# Patient Record
Sex: FEMALE | Race: OTHER | URBAN - METROPOLITAN AREA
[De-identification: names, ages, dates, MRNs, and addresses within clinical notes are randomized per-mention and may not be internally consistent; named-entity substitution may affect disease eponyms.]

---

## 2017-03-23 ENCOUNTER — OUTPATIENT (OUTPATIENT)
Dept: OUTPATIENT SERVICES | Facility: HOSPITAL | Age: 37
LOS: 1 days | End: 2017-03-23
Payer: COMMERCIAL

## 2017-03-23 ENCOUNTER — APPOINTMENT (OUTPATIENT)
Dept: RADIOLOGY | Facility: CLINIC | Age: 37
End: 2017-03-23

## 2017-03-23 DIAGNOSIS — Z00.8 ENCOUNTER FOR OTHER GENERAL EXAMINATION: ICD-10-CM

## 2017-03-23 PROCEDURE — 73562 X-RAY EXAM OF KNEE 3: CPT

## 2019-09-30 ENCOUNTER — RESULT REVIEW (OUTPATIENT)
Age: 39
End: 2019-09-30

## 2019-11-11 ENCOUNTER — APPOINTMENT (OUTPATIENT)
Dept: MAMMOGRAPHY | Facility: CLINIC | Age: 39
End: 2019-11-11

## 2021-08-16 ENCOUNTER — APPOINTMENT (OUTPATIENT)
Dept: MAMMOGRAPHY | Facility: CLINIC | Age: 41
End: 2021-08-16

## 2022-02-04 ENCOUNTER — EMERGENCY (EMERGENCY)
Facility: HOSPITAL | Age: 42
LOS: 0 days | Discharge: HOME | End: 2022-02-05
Attending: EMERGENCY MEDICINE
Payer: SELF-PAY

## 2022-02-04 VITALS
HEART RATE: 75 BPM | RESPIRATION RATE: 20 BRPM | SYSTOLIC BLOOD PRESSURE: 133 MMHG | TEMPERATURE: 98 F | OXYGEN SATURATION: 99 % | DIASTOLIC BLOOD PRESSURE: 111 MMHG

## 2022-02-04 DIAGNOSIS — F10.129 ALCOHOL ABUSE WITH INTOXICATION, UNSPECIFIED: ICD-10-CM

## 2022-02-04 DIAGNOSIS — Z20.822 CONTACT WITH AND (SUSPECTED) EXPOSURE TO COVID-19: ICD-10-CM

## 2022-02-04 DIAGNOSIS — R00.0 TACHYCARDIA, UNSPECIFIED: ICD-10-CM

## 2022-02-04 LAB
ALBUMIN SERPL ELPH-MCNC: 4.1 G/DL — SIGNIFICANT CHANGE UP (ref 3.3–5)
ALP SERPL-CCNC: 58 U/L — SIGNIFICANT CHANGE UP (ref 40–120)
ALT FLD-CCNC: 35 U/L — SIGNIFICANT CHANGE UP (ref 12–78)
ANION GAP SERPL CALC-SCNC: 9 MMOL/L — SIGNIFICANT CHANGE UP (ref 5–17)
APAP SERPL-MCNC: <2 UG/ML — LOW (ref 10–30)
APPEARANCE UR: CLEAR — SIGNIFICANT CHANGE UP
AST SERPL-CCNC: 31 U/L — SIGNIFICANT CHANGE UP (ref 15–37)
BASOPHILS # BLD AUTO: 0.13 K/UL — SIGNIFICANT CHANGE UP (ref 0–0.2)
BASOPHILS NFR BLD AUTO: 1.4 % — SIGNIFICANT CHANGE UP (ref 0–2)
BILIRUB SERPL-MCNC: 0.3 MG/DL — SIGNIFICANT CHANGE UP (ref 0.2–1.2)
BILIRUB UR-MCNC: NEGATIVE — SIGNIFICANT CHANGE UP
BUN SERPL-MCNC: 8 MG/DL — SIGNIFICANT CHANGE UP (ref 7–23)
CALCIUM SERPL-MCNC: 8.6 MG/DL — SIGNIFICANT CHANGE UP (ref 8.5–10.1)
CHLORIDE SERPL-SCNC: 111 MMOL/L — HIGH (ref 96–108)
CO2 SERPL-SCNC: 26 MMOL/L — SIGNIFICANT CHANGE UP (ref 22–31)
COLOR SPEC: YELLOW — SIGNIFICANT CHANGE UP
CREAT SERPL-MCNC: 0.62 MG/DL — SIGNIFICANT CHANGE UP (ref 0.5–1.3)
DIFF PNL FLD: ABNORMAL
EOSINOPHIL # BLD AUTO: 0.04 K/UL — SIGNIFICANT CHANGE UP (ref 0–0.5)
EOSINOPHIL NFR BLD AUTO: 0.4 % — SIGNIFICANT CHANGE UP (ref 0–6)
ETHANOL SERPL-MCNC: 343 MG/DL — HIGH (ref 0–10)
GLUCOSE SERPL-MCNC: 91 MG/DL — SIGNIFICANT CHANGE UP (ref 70–99)
GLUCOSE UR QL: NEGATIVE — SIGNIFICANT CHANGE UP
HCG SERPL-ACNC: <1 MIU/ML — SIGNIFICANT CHANGE UP
HCT VFR BLD CALC: 37.5 % — SIGNIFICANT CHANGE UP (ref 34.5–45)
HGB BLD-MCNC: 12.3 G/DL — SIGNIFICANT CHANGE UP (ref 11.5–15.5)
IMM GRANULOCYTES NFR BLD AUTO: 0.4 % — SIGNIFICANT CHANGE UP (ref 0–1.5)
KETONES UR-MCNC: NEGATIVE — SIGNIFICANT CHANGE UP
LEUKOCYTE ESTERASE UR-ACNC: ABNORMAL
LYMPHOCYTES # BLD AUTO: 2.4 K/UL — SIGNIFICANT CHANGE UP (ref 1–3.3)
LYMPHOCYTES # BLD AUTO: 25.3 % — SIGNIFICANT CHANGE UP (ref 13–44)
MCHC RBC-ENTMCNC: 28.3 PG — SIGNIFICANT CHANGE UP (ref 27–34)
MCHC RBC-ENTMCNC: 32.8 GM/DL — SIGNIFICANT CHANGE UP (ref 32–36)
MCV RBC AUTO: 86.4 FL — SIGNIFICANT CHANGE UP (ref 80–100)
MONOCYTES # BLD AUTO: 0.25 K/UL — SIGNIFICANT CHANGE UP (ref 0–0.9)
MONOCYTES NFR BLD AUTO: 2.6 % — SIGNIFICANT CHANGE UP (ref 2–14)
NEUTROPHILS # BLD AUTO: 6.64 K/UL — SIGNIFICANT CHANGE UP (ref 1.8–7.4)
NEUTROPHILS NFR BLD AUTO: 69.9 % — SIGNIFICANT CHANGE UP (ref 43–77)
NITRITE UR-MCNC: NEGATIVE — SIGNIFICANT CHANGE UP
PCP SPEC-MCNC: SIGNIFICANT CHANGE UP
PH UR: 6 — SIGNIFICANT CHANGE UP (ref 5–8)
PLATELET # BLD AUTO: 310 K/UL — SIGNIFICANT CHANGE UP (ref 150–400)
POTASSIUM SERPL-MCNC: 3.6 MMOL/L — SIGNIFICANT CHANGE UP (ref 3.5–5.3)
POTASSIUM SERPL-SCNC: 3.6 MMOL/L — SIGNIFICANT CHANGE UP (ref 3.5–5.3)
PROT SERPL-MCNC: 7.9 GM/DL — SIGNIFICANT CHANGE UP (ref 6–8.3)
PROT UR-MCNC: NEGATIVE — SIGNIFICANT CHANGE UP
RBC # BLD: 4.34 M/UL — SIGNIFICANT CHANGE UP (ref 3.8–5.2)
RBC # FLD: 15.2 % — HIGH (ref 10.3–14.5)
SALICYLATES SERPL-MCNC: <1.7 MG/DL — LOW (ref 2.8–20)
SARS-COV-2 RNA SPEC QL NAA+PROBE: SIGNIFICANT CHANGE UP
SODIUM SERPL-SCNC: 146 MMOL/L — HIGH (ref 135–145)
SP GR SPEC: 1.01 — SIGNIFICANT CHANGE UP (ref 1.01–1.02)
UROBILINOGEN FLD QL: NEGATIVE — SIGNIFICANT CHANGE UP
WBC # BLD: 9.5 K/UL — SIGNIFICANT CHANGE UP (ref 3.8–10.5)
WBC # FLD AUTO: 9.5 K/UL — SIGNIFICANT CHANGE UP (ref 3.8–10.5)

## 2022-02-04 PROCEDURE — 85025 COMPLETE CBC W/AUTO DIFF WBC: CPT

## 2022-02-04 PROCEDURE — 99285 EMERGENCY DEPT VISIT HI MDM: CPT

## 2022-02-04 PROCEDURE — 80053 COMPREHEN METABOLIC PANEL: CPT

## 2022-02-04 PROCEDURE — 36415 COLL VENOUS BLD VENIPUNCTURE: CPT

## 2022-02-04 PROCEDURE — 93005 ELECTROCARDIOGRAM TRACING: CPT

## 2022-02-04 PROCEDURE — 84702 CHORIONIC GONADOTROPIN TEST: CPT

## 2022-02-04 PROCEDURE — 99284 EMERGENCY DEPT VISIT MOD MDM: CPT | Mod: 25

## 2022-02-04 PROCEDURE — U0003: CPT

## 2022-02-04 PROCEDURE — 96374 THER/PROPH/DIAG INJ IV PUSH: CPT

## 2022-02-04 PROCEDURE — U0005: CPT

## 2022-02-04 PROCEDURE — 81001 URINALYSIS AUTO W/SCOPE: CPT

## 2022-02-04 PROCEDURE — 93010 ELECTROCARDIOGRAM REPORT: CPT

## 2022-02-04 PROCEDURE — 80307 DRUG TEST PRSMV CHEM ANLYZR: CPT

## 2022-02-04 RX ORDER — SODIUM CHLORIDE 9 MG/ML
1000 INJECTION INTRAMUSCULAR; INTRAVENOUS; SUBCUTANEOUS ONCE
Refills: 0 | Status: COMPLETED | OUTPATIENT
Start: 2022-02-04 | End: 2022-02-04

## 2022-02-04 RX ORDER — HALOPERIDOL DECANOATE 100 MG/ML
5 INJECTION INTRAMUSCULAR ONCE
Refills: 0 | Status: COMPLETED | OUTPATIENT
Start: 2022-02-04 | End: 2022-02-04

## 2022-02-04 RX ADMIN — HALOPERIDOL DECANOATE 5 MILLIGRAM(S): 100 INJECTION INTRAMUSCULAR at 17:34

## 2022-02-04 RX ADMIN — Medication 1 MILLIGRAM(S): at 17:34

## 2022-02-04 RX ADMIN — SODIUM CHLORIDE 1000 MILLILITER(S): 9 INJECTION INTRAMUSCULAR; INTRAVENOUS; SUBCUTANEOUS at 18:33

## 2022-02-04 NOTE — ED PROVIDER NOTE - MUSCULOSKELETAL, MLM
Spine appears normal, range of motion is not limited, no muscle or joint tenderness Spine appears normal, no muscle or joint tenderness.  + B/L handcuffs in place.  DELGADO x 4, though UEs ROM limited due to handcuffs.

## 2022-02-04 NOTE — ED PROVIDER NOTE - PSYCHIATRIC, MLM
Awake, alert, screaming, poorly cooperative, combative, hostile, agitates, anxious, verbal efforts of deescalation unsuccessful, poor insight. Complete exam unobtainable. Awake, alert, screaming, poorly cooperative, physically combative/ thrashing all 4 extremities, hostile, agitated, anxious, verbal efforts of deescalation unsuccessful, poor insight. Complete psychiatric exam unobtainable.

## 2022-02-04 NOTE — ED ADULT TRIAGE NOTE - CHIEF COMPLAINT QUOTE
pt [employer]IBEMS and [employer] badge #[id number]. pt is under arrest,  has restraining order against pt for intoxication. [employer] called by pt's . pt presents to ED intoxicated, extremely upset, crying and screaming. states "nobody cares about me" and is concerned that she is going to be deported. as per PD pt also found with fluoxetine pills. pt unable to verbalize what she took. states "I drank and took pills just to feel better." pt placed on constant observation.

## 2022-02-04 NOTE — ED ADULT NURSE REASSESSMENT NOTE - NS ED NURSE REASSESS COMMENT FT1
received pt at beginning of shift, easily arouse able, no acute respiratory distress noted, pt on monitor. remains on 1:1 for safety. all safety maintain. hourly rounds in place. received pt at beginning of shift, easily arouse able, no acute respiratory distress noted, pt on monitor. remains on 1:1 for safety, [employer] bed side. all safety maintain. hourly rounds in place.

## 2022-02-04 NOTE — ED PROVIDER NOTE - NSFOLLOWUPINSTRUCTIONS_ED_ALL_ED_FT
Pt is+ FIT FOR CONFIEMENT.    Avoid abusing alcohol.      Abuso de alcohol    LO QUE NECESITA SABER:    ¿Qué es el abuso de alcohol?El abuso de alcohol significa que usted mariel más de los límites diarios o semanales recomendados. Usted puede estar bebiendo alcohol regularmente o bebiendo grandes cantidades en un corto período (atracones de bebida). Usted sigue tomando, aunque esto le cause problemas legales, laborales o con guerda relaciones.    ¿Qué necesito saber acerca de los límites recomendados de alcohol?Angelica bebida se define elder 12 onzas (oz) de cerveza, 5 onzas de vino o 1,5 onzas de licor elder el whisky.  •Los hombres de 21 a 64 añosdeberían limitar el consumo de alcohol a 2 tragos al día. No tome más de 4 bebidas por día o más de 14 en angelica semana.      •Todos los hombres y las mujeres de 65 años o másdeberían limitar el consumo de alcohol a 1 trago por día. No tome más de 3 bebidas por día o más de 7 en angelica semana. No consuma bebidas alcohólicas si está embarazada.      ¿Cuáles son los signos y síntomas del abuso de alcohol?  •Pérdida de interés en guerda actividades, trabajo y labor escolar      •Esconder alcohol o beber en privado      •Depresión o sentimiento de culpa por beber      •Pensamientos constantes acerca del alcohol      •Beber por la mañana para aliviar los efectos de la resaca      •No poder controlar la cantidad que se gia      •Inquietud, comportamiento errático y violento      ¿Qué problemas de lindsey puede causar el abuso del alcohol?  •Cáncer en hígado, páncreas, estómago, colon, riñón o mamas      •Derrame cerebral o ataque cardíaco      •Enfermedad hepática, renal o pulmonar      •Desmayos, pérdida de memoria, daño cerebral o demencia      •Diabetes, problemas del sistema inmunológico o deficiencia de tiamina (vitamina B1)      •Problemas para usted y albert bebé si mariel mejia el embarazo      ¿Cómo se trata el abuso del alcohol?El tratamiento lo va a ayudar a comprender la razón por la cual usted abusa del alcohol. Los consejeros y terapeutas le van a proveer apoyo y lo van a ayudar a encontrar formas de afrontamiento en vez de elissa. Usted podría necesitar tratamiento con internación para proveerle un ambiente controlado. Usted podría necesitar tratamiento ambulatorio después de que albert tratamiento hospitalario haya sido completado.  •La desintoxicaciónes un programa utilizado para eliminar el alcohol de albert cuerpo. Mejia la desintoxicación se administran medicamentos para ayudar a evitar los síntomas de abstinencia que se presentan cuando se suspende el consumo de alcohol.      •La terapia de intervención brevelo ayuda a pensar de manera diferente sobre albert consumo de alcohol. Un médico lo ayuda a fijar metas para disminuir albert consumo de bebidas alcohólicas. La terapia puede continuar después de que sale del hospital.      •Suplementos vitamínicos, elder B1, pueden ser necesarios. El consumo de alcohol puede dificultar la capacidad del organismo de absorber suficiente vitamina B1. Es posible que le den vitamina B1 si albert nivel es bajo. También se administra para prevenir el daño cerebral por consumo de alcohol.      ¿Qué puedo hacer para manejar mi abuso de alcohol?  •Trabaje con los médicos en los objetivos para beber menos.[place] puede ayudar a prevenir problemas de lindsey. Por ejemplo, puede empezar por planificar albert consumo semanal de alcohol. Será más fácil elissa menos bebidas si planifica con antelación.      •Cuando becky alcohol, acompáñelo con comida.La comida evitará que el alcohol entre en albert sistema demasiado rápido. Coma antes de elissa albert primera bebida alcohólica.      •Calcule con cuidado el tiempo de guerda bebidas.No tome más de angelica bebida por hora. [place] líquido, elder agua, café o un refresco, entre las bebidas alcohólicas.      •No maneje si ha tomado alcohol.Planifique con antelación para tener un viaje seguro a casa. Asegúrese de que alguien que no haya estado bebiendo lo pueda llevar a casa. Planifique el uso de un taxi u otro servicio de transporte, si lo necesita.      •No becky alcohol si está tomando bobby medicamento.El alcohol es peligroso cuando se combina con ciertos medicamentos, elder acetaminofeno o un medicamento para la presión arterial. Hable con albert médico acerca de todos los medicamentos que está tomando.      ¿Dónde puedo obtener apoyo y más información?  •Alcoholics Anonymous  Web Address: http://www.aa.org      •Substance Abuse and Mental Health Services Administration  [address]  [place] [address]  Web Address: http://www.samhsa.gov        Llame al número local de emergencias (911 en los [place]), o pídale a alguien que llame si:  •Tiene dolor en el pecho o dificultad para respirar de forma repentina.      •Usted quiere lastimarse o lastimar a otros.      •Usted sufre angelica convulsión.      ¿Cuándo estephanie buscar atención inmediatamente?  •Usted no puede dejar de vomitar o vomita lui.          ¿Cuándo estephanie llamar a mi médico?  •Usted tiene alucinaciones (ve o escucha cosas que no son reales).      •Usted tiene preguntas o inquietudes acerca de albert condición o cuidado.      ACUERDOS SOBRE ALBERT CUIDADO:    Usted tiene el derecho de ayudar a planear albert cuidado. Aprenda todo lo que pueda sobre albert condición y elder darle tratamiento. Discuta guerda opciones de tratamiento con guerda médicos para decidir el cuidado que usted desea recibir. Usted siempre tiene el derecho de rechazar el tratamiento.

## 2022-02-04 NOTE — ED PROVIDER NOTE - CLINICAL SUMMARY MEDICAL DECISION MAKING FREE TEXT BOX
42 y/o  female [employer] from home regarding physical intoxication, agitation, hostility. Pt reports she took several anti-anxiety this morning. Pt combative, hostile, unable to verbally de-escalate. Plan: IM haldol 5/Ativan 1, pulse ox, CO2 capnometry, EKG, psych labs when able, IVF when possible, 1:1 observation, monitor, observe, reassess. 40 y/o  female [employer] from home regarding physical intoxication, agitation, hostility. Pt reports she took several anti-anxiety this morning. Pt physically combative, hostile, unable to verbally de-escalate. Plan: IM haldol 5/Ativan 1, pulse ox, CO2 capnometry, EKG, psych labs when able, IVF when possible, 1:1 observation, monitor, observe, reassess.

## 2022-02-04 NOTE — ED PROVIDER NOTE - PATIENT PORTAL LINK FT
You can access the FollowMyHealth Patient Portal offered by [facility] by registering at the following website: [email or web address]. By joining [email or web address]’s FollowMyHealth portal, you will also be able to view your health information using other applications (apps) compatible with our system.

## 2022-02-04 NOTE — ED ADULT NURSE NOTE - CHIEF COMPLAINT QUOTE
pt BIBEMS and [employer] badge #[id number]. pt is under arrest,  has restraining order against pt for intoxication. [employer] called by pt's . pt presents to ED intoxicated, extremely upset, crying and screaming. states "nobody cares about me" and is concerned that she is going to be deported. as per PD pt also found with fluoxetine pills. pt unable to verbalize what she took. states "I drank and took pills just to feel better." pt placed on constant observation.

## 2022-02-04 NOTE — ED ADULT NURSE NOTE - BREATHING, MLM
Is This A New Presentation, Or A Follow-Up?: Skin Lesions How Severe Is Your Skin Lesion?: mild Have Your Skin Lesions Been Treated?: not been treated Spontaneous, unlabored and symmetrical

## 2022-02-04 NOTE — ED PROVIDER NOTE - PROGRESS NOTE DETAILS
MORGAN Alcantar MD:  Pt clinically much improved s/p medication/IVF.  Pt alert, awake, + rational, clinically sober & cooperative.  Pt denies taking any of her anti-anxiety medication today because she admits to drinking much alcohol.  No active SI at present.  Pt + fit for confinement. MORGAN Alcantar MD:  Pt clinically much improved s/p medication/IVF & time.  Pt alert, awake, + rational, clinically sober & cooperative.  Pt denies taking any of her anti-anxiety medication today because she admits to drinking much alcohol.  No active SI at present.  Pt + fit for confinement.

## 2022-02-04 NOTE — ED PROVIDER NOTE - OBJECTIVE STATEMENT
42 y/o female BIBPEDRO presents to the ED for psych eval. Per [employer], the patient's  called because the patient was intoxicated and causing disturbance. The pt admits to drinking and took 30mg of her medication for anxiety given by her PMD this morning. +SI. Pt is consistently repeating "nobody cares about me." Pt reports she "feels dead inside" and states her  is trying to deport her. Pt states she "always feels like this and wants to know why nobody cares about her." Complete history unobtainable, patient clinically intoxicated, agitated,  combative, and hostile. 42 y/o female KACEY, under arrest, presents to the ED for psych eval, + intoxicated. Per [employer], the patient's  called because the patient was intoxicated and causing disturbance. The pt admits to drinking alcohol today and alleges that she took this morning 30mg of her prescribed anti-anxiety medication. +SI. Pt is consistently repeating "nobody cares about me." Pt reports she "feels dead inside" and states her  is trying to deport her. Pt states she "always feels like this and wants to know why nobody cares about her." Complete history unobtainable, patient clinically intoxicated, agitated,  combative, and hostile.

## 2022-02-04 NOTE — ED PROVIDER NOTE - CONSTITUTIONAL, MLM
normal...  female, awake, alert, agitated, screaming  female, awake, alert, agitated, screaming, + clinically intoxicated.

## 2022-02-04 NOTE — ED PROVIDER NOTE - UNABLE TO OBTAIN
Urgent need for Intervention Complete history unobtainable, patient clinically intoxicated, agitated,  combative, and hostile. Complete history unobtainable, patient clinically intoxicated, agitated, physically combative, and hostile.

## 2022-02-04 NOTE — ED ADULT NURSE NOTE - OBJECTIVE STATEMENT
Pt brought in by PD, Officer Elio #[id number] and Officer #[id number] [employer] for violating a restraining order by her . Pt exclaimed that she is going to kill herself and that no one cares about her. Pt appears intoxicated and is agitated. Pt 5&1, Pt is calmer after administration. Pt admits to drinking vodka and seltzer, denies drug use. Pt wanded by Roderick from security. Belongings taken to held by security, Folder #[id number]. 20G IV placed to right AC, flushes without difficulty. Blood and COVID swab sent to lab, pending urine. Fluids running as per order. Pt on constant observation/1:1. Pending psych evaluation, will continue to monitor.

## 2022-02-04 NOTE — ED PROVIDER NOTE - ENMT, MLM
oral pharynx clear. Airway patent, Nasal mucosa clear. Mouth with slightly dry mucosa. Throat has no vesicles, no oropharyngeal exudates and uvula is midline.

## 2022-02-05 VITALS
RESPIRATION RATE: 18 BRPM | TEMPERATURE: 98 F | SYSTOLIC BLOOD PRESSURE: 115 MMHG | HEART RATE: 84 BPM | OXYGEN SATURATION: 97 % | DIASTOLIC BLOOD PRESSURE: 75 MMHG

## 2022-02-05 NOTE — ED ADULT NURSE REASSESSMENT NOTE - NS ED NURSE REASSESS COMMENT FT1
pt resting comfortably, no acute distress noted, not agitated, cooperative. iv line removed. vitals stable. steady on her feet. D/C paper given [employer]. BADGE#[id number].

## 2022-03-30 ENCOUNTER — EMERGENCY (EMERGENCY)
Facility: HOSPITAL | Age: 42
LOS: 0 days | Discharge: HOME | End: 2022-03-30
Attending: FAMILY MEDICINE
Payer: MEDICAID

## 2022-03-30 VITALS
HEIGHT: 61 IN | RESPIRATION RATE: 12 BRPM | SYSTOLIC BLOOD PRESSURE: 128 MMHG | OXYGEN SATURATION: 100 % | DIASTOLIC BLOOD PRESSURE: 82 MMHG | HEART RATE: 110 BPM | TEMPERATURE: 98 F | WEIGHT: 158.07 LBS

## 2022-03-30 DIAGNOSIS — R41.82 ALTERED MENTAL STATUS, UNSPECIFIED: ICD-10-CM

## 2022-03-30 DIAGNOSIS — F10.929 ALCOHOL USE, UNSPECIFIED WITH INTOXICATION, UNSPECIFIED: ICD-10-CM

## 2022-03-30 PROCEDURE — 82962 GLUCOSE BLOOD TEST: CPT

## 2022-03-30 PROCEDURE — 99284 EMERGENCY DEPT VISIT MOD MDM: CPT

## 2022-03-30 PROCEDURE — 99284 EMERGENCY DEPT VISIT MOD MDM: CPT | Mod: 25

## 2022-03-30 RX ORDER — ONDANSETRON 8 MG/1
4 TABLET, FILM COATED ORAL ONCE
Refills: 0 | Status: COMPLETED | OUTPATIENT
Start: 2022-03-30 | End: 2022-03-30

## 2022-03-30 RX ORDER — ONDANSETRON 8 MG/1
4 TABLET, FILM COATED ORAL ONCE
Refills: 0 | Status: DISCONTINUED | OUTPATIENT
Start: 2022-03-30 | End: 2022-03-30

## 2022-03-30 RX ADMIN — ONDANSETRON 4 MILLIGRAM(S): 8 TABLET, FILM COATED ORAL at 21:58

## 2022-03-30 NOTE — ED PROVIDER NOTE - PATIENT PORTAL LINK FT
You can access the FollowMyHealth Patient Portal offered by Health system by registering at the following website: [email or web address]. By joining [employer]’s FollowMyHealth portal, you will also be able to view your health information using other applications (apps) compatible with our system.

## 2022-03-30 NOTE — ED PROVIDER NOTE - OBJECTIVE STATEMENT
40 y/o female presents to the ED for EtOH intoxication. Pt states "I felt empty and started drinking." Pt admits to drinking vodka. No drug use. Denies SI. Pt saw her therapist last week. 40 y/o female presents to the ED for EtOH intoxication. Pt states "I felt empty and started drinking." Pt admits to drinking vodka. No drug use. Denies SI. Pt saw her therapist last week. States drinks because she feels empty inside. Not homocidal no  injury. Brother willingto .

## 2022-03-30 NOTE — ED PROVIDER NOTE - EYES, MLM
Clear bilaterally, pupils equal, round and reactive to light. Clear bilaterally, pupils equal, round and reactive to light. Eyes red.
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
## 2022-03-31 ENCOUNTER — EMERGENCY (EMERGENCY)
Facility: HOSPITAL | Age: 42
LOS: 0 days | Discharge: HOME | End: 2022-03-31
Attending: EMERGENCY MEDICINE
Payer: MEDICAID

## 2022-03-31 VITALS
RESPIRATION RATE: 16 BRPM | DIASTOLIC BLOOD PRESSURE: 84 MMHG | OXYGEN SATURATION: 97 % | TEMPERATURE: 98 F | WEIGHT: 147.93 LBS | HEIGHT: 61 IN | SYSTOLIC BLOOD PRESSURE: 116 MMHG | HEART RATE: 97 BPM

## 2022-03-31 VITALS
RESPIRATION RATE: 18 BRPM | DIASTOLIC BLOOD PRESSURE: 62 MMHG | SYSTOLIC BLOOD PRESSURE: 101 MMHG | TEMPERATURE: 99 F | HEART RATE: 72 BPM | OXYGEN SATURATION: 100 %

## 2022-03-31 DIAGNOSIS — S00.81XA ABRASION OF OTHER PART OF HEAD, INITIAL ENCOUNTER: ICD-10-CM
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
## 2022-03-31 DIAGNOSIS — S40.211A ABRASION OF RIGHT SHOULDER, INITIAL ENCOUNTER: ICD-10-CM
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
## 2022-03-31 LAB
ALBUMIN SERPL ELPH-MCNC: 4.4 G/DL — SIGNIFICANT CHANGE UP (ref 3.3–5)
ALP SERPL-CCNC: 58 U/L — SIGNIFICANT CHANGE UP (ref 40–120)
ALT FLD-CCNC: 29 U/L — SIGNIFICANT CHANGE UP (ref 12–78)
ANION GAP SERPL CALC-SCNC: 6 MMOL/L — SIGNIFICANT CHANGE UP (ref 5–17)
APTT BLD: 24.7 SEC — LOW (ref 27.5–35.5)
AST SERPL-CCNC: 29 U/L — SIGNIFICANT CHANGE UP (ref 15–37)
BASOPHILS # BLD AUTO: 0.11 K/UL — SIGNIFICANT CHANGE UP (ref 0–0.2)
BASOPHILS NFR BLD AUTO: 1.2 % — SIGNIFICANT CHANGE UP (ref 0–2)
BILIRUB SERPL-MCNC: 0.2 MG/DL — SIGNIFICANT CHANGE UP (ref 0.2–1.2)
BUN SERPL-MCNC: 9 MG/DL — SIGNIFICANT CHANGE UP (ref 7–23)
CALCIUM SERPL-MCNC: 9 MG/DL — SIGNIFICANT CHANGE UP (ref 8.5–10.1)
CHLORIDE SERPL-SCNC: 109 MMOL/L — HIGH (ref 96–108)
CO2 SERPL-SCNC: 29 MMOL/L — SIGNIFICANT CHANGE UP (ref 22–31)
CREAT SERPL-MCNC: 0.94 MG/DL — SIGNIFICANT CHANGE UP (ref 0.5–1.3)
EGFR: 79 ML/MIN/1.73M2 — SIGNIFICANT CHANGE UP
EOSINOPHIL # BLD AUTO: 0.16 K/UL — SIGNIFICANT CHANGE UP (ref 0–0.5)
EOSINOPHIL NFR BLD AUTO: 1.8 % — SIGNIFICANT CHANGE UP (ref 0–6)
GLUCOSE SERPL-MCNC: 101 MG/DL — HIGH (ref 70–99)
HCT VFR BLD CALC: 40.6 % — SIGNIFICANT CHANGE UP (ref 34.5–45)
HGB BLD-MCNC: 13.3 G/DL — SIGNIFICANT CHANGE UP (ref 11.5–15.5)
IMM GRANULOCYTES NFR BLD AUTO: 0.2 % — SIGNIFICANT CHANGE UP (ref 0–1.5)
INR BLD: 0.93 RATIO — SIGNIFICANT CHANGE UP (ref 0.88–1.16)
LYMPHOCYTES # BLD AUTO: 4.3 K/UL — HIGH (ref 1–3.3)
LYMPHOCYTES # BLD AUTO: 48.2 % — HIGH (ref 13–44)
MCHC RBC-ENTMCNC: 28.9 PG — SIGNIFICANT CHANGE UP (ref 27–34)
MCHC RBC-ENTMCNC: 32.8 GM/DL — SIGNIFICANT CHANGE UP (ref 32–36)
MCV RBC AUTO: 88.3 FL — SIGNIFICANT CHANGE UP (ref 80–100)
MONOCYTES # BLD AUTO: 0.55 K/UL — SIGNIFICANT CHANGE UP (ref 0–0.9)
MONOCYTES NFR BLD AUTO: 6.2 % — SIGNIFICANT CHANGE UP (ref 2–14)
NEUTROPHILS # BLD AUTO: 3.78 K/UL — SIGNIFICANT CHANGE UP (ref 1.8–7.4)
NEUTROPHILS NFR BLD AUTO: 42.4 % — LOW (ref 43–77)
PLATELET # BLD AUTO: 283 K/UL — SIGNIFICANT CHANGE UP (ref 150–400)
POTASSIUM SERPL-MCNC: 3.5 MMOL/L — SIGNIFICANT CHANGE UP (ref 3.5–5.3)
POTASSIUM SERPL-SCNC: 3.5 MMOL/L — SIGNIFICANT CHANGE UP (ref 3.5–5.3)
PROT SERPL-MCNC: 8.4 GM/DL — HIGH (ref 6–8.3)
PROTHROM AB SERPL-ACNC: 10.8 SEC — SIGNIFICANT CHANGE UP (ref 10.5–13.4)
RBC # BLD: 4.6 M/UL — SIGNIFICANT CHANGE UP (ref 3.8–5.2)
RBC # FLD: 14.7 % — HIGH (ref 10.3–14.5)
SODIUM SERPL-SCNC: 144 MMOL/L — SIGNIFICANT CHANGE UP (ref 135–145)
WBC # BLD: 8.92 K/UL — SIGNIFICANT CHANGE UP (ref 3.8–10.5)
WBC # FLD AUTO: 8.92 K/UL — SIGNIFICANT CHANGE UP (ref 3.8–10.5)

## 2022-03-31 PROCEDURE — 36415 COLL VENOUS BLD VENIPUNCTURE: CPT

## 2022-03-31 PROCEDURE — 71045 X-RAY EXAM CHEST 1 VIEW: CPT

## 2022-03-31 PROCEDURE — 70450 CT HEAD/BRAIN W/O DYE: CPT | Mod: 26,MA

## 2022-03-31 PROCEDURE — 72125 CT NECK SPINE W/O DYE: CPT | Mod: 26,MA

## 2022-03-31 PROCEDURE — 99285 EMERGENCY DEPT VISIT HI MDM: CPT

## 2022-03-31 PROCEDURE — 76376 3D RENDER W/INTRP POSTPROCES: CPT | Mod: 26

## 2022-03-31 PROCEDURE — 99284 EMERGENCY DEPT VISIT MOD MDM: CPT | Mod: 25

## 2022-03-31 PROCEDURE — 72125 CT NECK SPINE W/O DYE: CPT | Mod: MA

## 2022-03-31 PROCEDURE — 85730 THROMBOPLASTIN TIME PARTIAL: CPT

## 2022-03-31 PROCEDURE — 85025 COMPLETE CBC W/AUTO DIFF WBC: CPT

## 2022-03-31 PROCEDURE — 71045 X-RAY EXAM CHEST 1 VIEW: CPT | Mod: 26

## 2022-03-31 PROCEDURE — 70450 CT HEAD/BRAIN W/O DYE: CPT | Mod: MA

## 2022-03-31 PROCEDURE — 73030 X-RAY EXAM OF SHOULDER: CPT | Mod: 26,RT

## 2022-03-31 PROCEDURE — 73030 X-RAY EXAM OF SHOULDER: CPT | Mod: RT

## 2022-03-31 PROCEDURE — 70486 CT MAXILLOFACIAL W/O DYE: CPT | Mod: MA

## 2022-03-31 PROCEDURE — 80053 COMPREHEN METABOLIC PANEL: CPT

## 2022-03-31 PROCEDURE — 85610 PROTHROMBIN TIME: CPT

## 2022-03-31 PROCEDURE — 70486 CT MAXILLOFACIAL W/O DYE: CPT | Mod: 26,MA

## 2022-03-31 PROCEDURE — 76376 3D RENDER W/INTRP POSTPROCES: CPT

## 2022-03-31 RX ORDER — TETANUS TOXOID, REDUCED DIPHTHERIA TOXOID AND ACELLULAR PERTUSSIS VACCINE, ADSORBED 5; 2.5; 8; 8; 2.5 [IU]/.5ML; [IU]/.5ML; UG/.5ML; UG/.5ML; UG/.5ML
0.5 SUSPENSION INTRAMUSCULAR ONCE
Refills: 0 | Status: COMPLETED | OUTPATIENT
Start: 2022-03-31 | End: 2022-03-31

## 2022-03-31 RX ORDER — ACETAMINOPHEN 500 MG
650 TABLET ORAL ONCE
Refills: 0 | Status: DISCONTINUED | OUTPATIENT
Start: 2022-03-31 | End: 2022-03-31

## 2022-03-31 NOTE — ED PROVIDER NOTE - PATIENT PORTAL LINK FT
You can access the FollowMyHealth Patient Portal offered by [facility] by registering at the following website: [email or web address]. By joining [employer]’s FollowMyHealth portal, you will also be able to view your health information using other applications (apps) compatible with our system.

## 2022-03-31 NOTE — ED PROVIDER NOTE - MUSCULOSKELETAL, MLM
Spine appears normal, range of motion is not limited, no muscle or joint tenderness. 5/5 strength. No midline spinal tenderness. Pain with abduction right shoulder. No bony tenderness. Full ROM.

## 2022-03-31 NOTE — ED ADULT NURSE NOTE - PAIN: PRESENCE, MLM
fall 5 feet from a ladder right shoulder pain LEft head hematoma and ecchymosis/complains of pain/discomfort

## 2022-03-31 NOTE — ED ADULT NURSE REASSESSMENT NOTE - NS ED NURSE REASSESS COMMENT FT1
Patient undressed, Assisted to use bedpan voided and had a bowel movement. Left IV placed and labs drawn and sent Patient taken to CT scan at 1710.

## 2022-03-31 NOTE — ED PROVIDER NOTE - OBJECTIVE STATEMENT
42 y/o female with a PMHx of EtOH abuse presents to the ED s/p fall from height. Pt states she was standing on top of a 5ft ladder trying to get into a window when she lost her balance and fell Pt hit her right shoulder and head on the ground. Denies LOC. Pt able to stand after. Not on ASA or anticoagulation. Pt admits to EtOH use today. +forehead abrasion. No other complaints at this time.

## 2022-03-31 NOTE — ED ADULT NURSE NOTE - OBJECTIVE STATEMENT
fall 5 feet from a ladder, ETOH on breath, Contusion to forehead , right shoulder pain, abrasions to both knees.  Patient not cooperative, speech slurred.

## 2022-03-31 NOTE — ED PROVIDER NOTE - NSFOLLOWUPCLINICS_GEN_ALL_ED_FT
[facility]  Family Medicine  [address] [place] [address]  Phone: [phone]  Fax:

## 2022-03-31 NOTE — ED PROVIDER NOTE - ATTENDING CONTRIBUTION TO CARE
I, Judith Grove MD, personally saw the patient with resident.  I have personally performed a face to face diagnostic evaluation on this patient.  I have reviewed the resident note and agree with the history, exam, and plan of care, except as noted.

## 2022-03-31 NOTE — ED ADULT NURSE REASSESSMENT NOTE - NS ED NURSE REASSESS COMMENT FT1
Scans negative, pt alert and sober, pt mtf, walked in hallway independently, DE home to follow up at [facility]

## 2022-03-31 NOTE — ED PROVIDER NOTE - NSFOLLOWUPINSTRUCTIONS_ED_ALL_ED_FT
Alcohol Use Disorder    WHAT YOU NEED TO KNOW:    Alcohol use disorder (AUD) is problem drinking. AUD includes alcohol abuse and alcohol dependency.     DISCHARGE INSTRUCTIONS:    Seek care immediately if:     Your heart is beating faster than usual.      You have hallucinations.      You cannot remember what happens while you are drinking.      You have seizures.    Contact your healthcare provider if:     You are anxious and have nausea.      Your hands are shaky and you are sweating heavily.      You have questions or concerns about your condition or care.    Follow up with your healthcare provider as directed: Do not try to stop drinking on your own. Your healthcare provider may need to help you withdraw from alcohol safely. He may need to admit you to the hospital. You may also need any of the following treatments:    Medicines to decrease your craving for alcohol      Support groups such as Alcoholics Anonymous       Therapy from a psychiatrist or psychologist       Admission to an inpatient facility for treatment for severe AUD    Interested in discussing options to reduce your alcohol or drug use?      [facility]: [phone]   [facility] Substance Abuse Services: [phone], option #2   Methadone Maintenance & Ambulatory Opiate Detox: [phone]  [facility]: [phone]  [facility]: [phone]  [facility]: [phone]    [facility]: [phone], option #2   [facility]: [phone]    [facility]: [phone]    [facility] Central Intake: [phone]  [facility] Chemical Dependency/Ancillary Withdrawal: [phone]  [facility] Methadone Maintenance: [phone]    [facility]: [phone]  [facility] Addiction Treatment Services: [phone]    [facility]: 6-928-0-HOPENY    [facility] Office of Alcoholism and Substance Abuse Services (OASAS): https://www.oasas.[place].gov/providerdirectory/  [facility] for Addiction Services and Psychotherapy Interventions Research (CASPIR)  www.[facility].org     Interested in discussing options to reduce your tobacco use?    [facility] for Tobacco Control:  [phone]  [facility] QUITLINE: 2-487-YJ-QUITS ([phone])    Interested in learning more about substance use?      http://rethinkingdrinking.niaaa.nih.gov   https://www.drugabuse.gov/patients-families     Learn more about opioid overdose prevention programs in [facility]:  http://www.[facility].ny.[facility]/diseases/aids/general/opioid_overdose_prevention/

## 2022-03-31 NOTE — ED PROVIDER NOTE - CLINICAL SUMMARY MEDICAL DECISION MAKING FREE TEXT BOX
42 y/o female s/p fall from height. GCS 15. A&Ox3. Plan: labs, imaging, reassess when clinically sober.

## 2022-03-31 NOTE — ED ADULT NURSE REASSESSMENT NOTE - NS ED NURSE REASSESS COMMENT FT1
Received pt from MARCUS Avila, pt given Librium for ETOH, plan of acre explained, pt awaiting tele psych Received pt from MARCUS Avila, pt asleep, awaiting mtf

## 2022-03-31 NOTE — ED ADULT TRIAGE NOTE - CHIEF COMPLAINT QUOTE
Patient comes in s/p fall 5 feet off ladder. + head strike, - loc. GCS 15. Patient endorses drinking liquor. Patient with right shoulder pain and abrasion / hematoma to right eyebrow.

## 2022-03-31 NOTE — ED PROVIDER NOTE - EYES, MLM
Clear bilaterally, pupils equal, round and reactive to light. Visual acuity 20/20 on right, 20/25 on left, 20/20 b/l uncorrected. Subconjunctival hemorrhage right eye.

## 2022-04-28 ENCOUNTER — OUTPATIENT (OUTPATIENT)
Dept: OUTPATIENT SERVICES | Facility: HOSPITAL | Age: 42
LOS: 1 days | End: 2022-04-28
Payer: COMMERCIAL

## 2022-04-28 ENCOUNTER — APPOINTMENT (OUTPATIENT)
Dept: RADIOLOGY | Facility: CLINIC | Age: 42
End: 2022-04-28

## 2022-04-28 DIAGNOSIS — Z00.8 ENCOUNTER FOR OTHER GENERAL EXAMINATION: ICD-10-CM

## 2022-04-28 PROCEDURE — 73030 X-RAY EXAM OF SHOULDER: CPT | Mod: 26,RT

## 2022-04-30 PROBLEM — Z00.00 ENCOUNTER FOR PREVENTIVE HEALTH EXAMINATION: Noted: 2022-04-30

## 2022-05-04 ENCOUNTER — OUTPATIENT (OUTPATIENT)
Dept: OUTPATIENT SERVICES | Facility: HOSPITAL | Age: 42
LOS: 1 days | End: 2022-05-04
Payer: MEDICAID

## 2022-05-04 ENCOUNTER — APPOINTMENT (OUTPATIENT)
Dept: ORTHOPEDIC SURGERY | Facility: HOSPITAL | Age: 42
End: 2022-05-04

## 2022-05-04 VITALS
BODY MASS INDEX: 26.85 KG/M2 | HEART RATE: 87 BPM | SYSTOLIC BLOOD PRESSURE: 115 MMHG | DIASTOLIC BLOOD PRESSURE: 69 MMHG | WEIGHT: 135 LBS | TEMPERATURE: 96.5 F | HEIGHT: 59.5 IN

## 2022-05-04 DIAGNOSIS — S42.253A DISPLACED FRACTURE OF GREATER TUBEROSITY OF UNSPECIFIED HUMERUS, INITIAL ENCOUNTER FOR CLOSED FRACTURE: ICD-10-CM

## 2022-05-04 DIAGNOSIS — M79.609 PAIN IN UNSPECIFIED LIMB: ICD-10-CM

## 2022-05-04 PROCEDURE — G0463: CPT

## 2022-05-06 PROBLEM — Z00.00 ENCOUNTER FOR PREVENTIVE HEALTH EXAMINATION: Status: ACTIVE | Noted: 2022-05-06

## 2022-06-01 ENCOUNTER — APPOINTMENT (OUTPATIENT)
Dept: ORTHOPEDIC SURGERY | Facility: HOSPITAL | Age: 42
End: 2022-06-01

## 2022-06-21 ENCOUNTER — EMERGENCY (EMERGENCY)
Facility: HOSPITAL | Age: 42
LOS: 0 days | Discharge: HOME | End: 2022-06-21
Attending: EMERGENCY MEDICINE
Payer: MEDICAID

## 2022-06-21 VITALS
OXYGEN SATURATION: 100 % | HEART RATE: 126 BPM | RESPIRATION RATE: 18 BRPM | SYSTOLIC BLOOD PRESSURE: 137 MMHG | TEMPERATURE: 99 F | DIASTOLIC BLOOD PRESSURE: 100 MMHG

## 2022-06-21 VITALS
SYSTOLIC BLOOD PRESSURE: 118 MMHG | HEART RATE: 104 BPM | OXYGEN SATURATION: 100 % | DIASTOLIC BLOOD PRESSURE: 81 MMHG | RESPIRATION RATE: 19 BRPM

## 2022-06-21 DIAGNOSIS — R00.0 TACHYCARDIA, UNSPECIFIED: ICD-10-CM

## 2022-06-21 DIAGNOSIS — F41.9 ANXIETY DISORDER, UNSPECIFIED: ICD-10-CM

## 2022-06-21 DIAGNOSIS — R45.1 RESTLESSNESS AND AGITATION: ICD-10-CM

## 2022-06-21 DIAGNOSIS — R07.9 CHEST PAIN, UNSPECIFIED: ICD-10-CM
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
## 2022-06-21 DIAGNOSIS — T74.91XA UNSPECIFIED ADULT MALTREATMENT, CONFIRMED, INITIAL ENCOUNTER: ICD-10-CM
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
## 2022-06-21 PROCEDURE — 93010 ELECTROCARDIOGRAM REPORT: CPT

## 2022-06-21 PROCEDURE — 99284 EMERGENCY DEPT VISIT MOD MDM: CPT

## 2022-06-21 PROCEDURE — 99283 EMERGENCY DEPT VISIT LOW MDM: CPT

## 2022-06-21 PROCEDURE — 93005 ELECTROCARDIOGRAM TRACING: CPT

## 2022-06-21 RX ORDER — IBUPROFEN 200 MG
600 TABLET ORAL ONCE
Refills: 0 | Status: COMPLETED | OUTPATIENT
Start: 2022-06-21 | End: 2022-06-21

## 2022-06-21 RX ORDER — ALPRAZOLAM 0.25 MG
0.25 TABLET ORAL ONCE
Refills: 0 | Status: DISCONTINUED | OUTPATIENT
Start: 2022-06-21 | End: 2022-06-21

## 2022-06-21 RX ADMIN — Medication 600 MILLIGRAM(S): at 14:38

## 2022-06-21 RX ADMIN — Medication 0.25 MILLIGRAM(S): at 14:38
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
## 2022-06-21 NOTE — ED STATDOCS - NSFOLLOWUPINSTRUCTIONS_ED_ALL_ED_FT
Intimate Partner Violence: [place]      Intimate partner violence, also called domestic violence, domestic abuse, or relationship abuse, is a pattern of behaviors used by one partner to gain or maintain power and control over the other partner. Intimate partner violence can happen to women and men and can happen between people who are or were:  •.      •Dating.      •Living together.      [place] law    [place] law defines domestic violence as:    "A pattern of coercive tactics which can include physical, psychological, sexual, economic, and emotional abuse, perpetrated by one person against an adult intimate partner, with the goal of establishing and maintaining power and control over the victim."      What are the types of intimate partner violence?    Different types of abuse can occur at the same time within the same relationship.  •Physical abuse. This includes rough handling, threats with a weapon, throwing objects, pushing, or hitting.      •Emotional and psychological abuse. This includes verbal attacks, rejection, humiliation, intimidation, social isolation, or threats. Abuse may also include limiting contact with family and friends.      •Sexual assault. Sexual assault is any unwanted sexual activity that occurs without clear permission (consent) from both people. This includes unwanted touching and sexual harassment.      •Economic abuse. This includes controlling money, food, transportation, or other belongings.      •Stalking. This involves such things as repeated, unwanted phone calls, e-mails, or text messages, or watching the victim from a distance.        What are some warning signs of intimate partner violence?    Physical signs     •Bruises.      •Broken bones.      •Burns or cuts.      •Physical pain.      •Head injury.      Emotional and psychological signs     •Crying.      •Depression.      •Hopelessness.      •Desperation.      •Trouble sleeping.      •Fear of the partner.      •Anxiety.      •Suicidal thoughts or behavior.      •Antisocial behavior.      •Low self-esteem.      •Fear of intimacy.      •Flashbacks.      Sexual signs     •Bruising, swelling, or bleeding of the genital or rectal area.      •Signs of an STI, such as genital sores, warts, or discharge coming from the genital area.      •Pain in the genital area.      •Unintended pregnancy.      •Problems with pregnancy.        What are common behaviors of those affected by intimate partner violence?    Those affected by intimate partner violence may:  •Be late to work or other events.      •Not show up to places as promised.      •Have to let their partner know where they are and who they are with.      •Be isolated or kept from seeing friends or family.      •Make comments about their partner's temper or behavior.      •Make excuses for their partner.      •Engage in high-risk sexual behaviors.      •Use drugs or alcohol.      •Have unhealthy eating behaviors.        What are common feelings of those affected by intimate partner violence?    Victims of intimate partner violence may feel that they:  •Must be careful not to say or do things that trigger their partner's anger.      •Cannot do anything right.      •Deserve to be treated badly.      •Overreact to their partner's behavior or temper.      •Cannot trust their own feelings.      •Cannot trust other people.      •Are trapped.      •May have their children taken away by their partner.      •Are emotionally drained or numb.      •Are in danger.      •Might have to kill their partner to survive.        Where can you get help?    If you do not feel safe searching for help online at home, use a computer at a [employer] to access the Internet. Call 218 if you are in immediate danger or need medical help.    Intimate partner violence hotlines and websites   •The National Domestic Violence Hotline.  •24-hour hotline: 1-725.735.4894 (SAFE) or 1-440.303.4956 (TTY).      •Videophone: available Monday through Friday, 9 a.m. to 5 p.m. Call 1-241.453.8424.      •THINK360.nVoq      •The [facility] Domestic Violence Hotline. This service is available in multiple languages.  •Call: 1-364.854.9065. If you are deaf or hard of hearing, dial 711.      •The [facility] Domestic Violence Hotline.  •Call: 1-542.119.4217 (HOPE). If you are deaf or hard of hearing, dial 311.      •The National Sexual Assault Hotline.  •24-hour phone hotline: 1-367.754.4912.      •safehelpline.org        Shelters for victims of intimate partner violence     If you are a victim of intimate partner violence, there are resources to help you find a temporary place for you and your children to live (shelter). The specific address of these shelters is often not known to the public. A complete list, by [facility], of domestic counseling centers and shelters in [facility] is online: www.[facility].org/find-help/program-directory.html    Police     Report assaults, threats, and stalking to the police.      Counselors and counseling centers      Counseling can help you cope with difficult emotions and empower you to plan for your future safety. The topics you discuss with a counselor are private and confidential. Children of intimate partner violence victims also might need counseling to manage stress and anxiety.    The court system     You can work with a  or an advocate to get legal protection against an abuser. Protection includes restraining orders and private addresses. Crimes against you, such as assault, can also be prosecuted through the courts.    For legal information, contact [facility]:  •1-230.165.6968 ([place])      •[email or web address]        Follow these instructions at home:  •Create a safety plan that includes ways to remain safe while in an abusive relationship, while you're planning to leave, or after you leave. This plan may be created by the victim alone or with assistance from the domestic violence hotline staff or local shelter staff. Your safety plan may include:  •How to cope with emotions.      •How to tell friends and family about the abuse.      •How to take legal action.      •How to create a safe home environment.      •How to keep your children safe.      •Emergency plans for life-threatening situations.          Get help right away if you:    •Feel like you are in immediate danger.      •Feel like you may hurt yourself or others.      If you ever feel like you may hurt yourself or others, or may have thoughts about taking your own life, get help right away. You can go to your nearest emergency department or call:   • Your local emergency services (911 in the [place]       • A suicide crisis helpline, such as the National Suicide Prevention Lifeline at 1-492.944.6207. This is open 24 hours a day.         Summary    •If you are in an abusive relationship, there are resources available to you to find ways to leave the relationship.      •Create a safety plan to find ways to remain safe while in an abusive relationship, while you're planning to leave, or after you leave.      This information is not intended to replace advice given to you by your health care provider. Make sure you discuss any questions you have with your health care provider.      Document Revised: 09/09/2020 Document Reviewed: 02/05/2019

## 2022-06-21 NOTE — ED STATDOCS - NSFOLLOWUPCLINICS_GEN_ALL_ED_FT
[facility]  Family Medicine  [address] [place] [address]  Phone: [phone]  Fax:
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
## 2022-06-21 NOTE — ED STATDOCS - SKIN, MLM
Ecchymosi to right upper chest wall and over sternum. Abrasion to left lower back and hip, Ecchymosis to left lateral forearm w/ abrasions. Ecchymosis to right upper chest wall and over sternum. Abrasion to left lower back and hip, Ecchymosis to left lateral forearm w/ abrasions.
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
## 2022-06-21 NOTE — ED STATDOCS - ATTENDING APP SHARED VISIT CONTRIBUTION OF CARE
I,Bobby Meadows MD,  performed the initial face to face bedside interview with this patient regarding history of present illness, review of symptoms and relevant past medical, social and family history.  I completed an independent physical examination.  I was the initial provider who evaluated this patient. I have signed out the follow up of any pending tests (i.e. labs, radiological studies) to the ACP.  I have communicated the patient’s plan of care and disposition with the ACP.  The history, relevant review of systems, past medical and surgical history, medical decision making, and physical examination was documented by the scribe in my presence and I attest to the accuracy of the documentation.
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
## 2022-06-21 NOTE — ED STATDOCS - PATIENT PORTAL LINK FT
You can access the FollowMyHealth Patient Portal offered by [facility] by registering at the following website: [email or web address]. By joining [employer]’s FollowMyHealth portal, you will also be able to view your health information using other applications (apps) compatible with our system.

## 2022-06-22 ENCOUNTER — EMERGENCY (EMERGENCY)
Facility: HOSPITAL | Age: 42
LOS: 0 days | Discharge: HOME | End: 2022-06-23
Attending: FAMILY MEDICINE
Payer: MEDICAID

## 2022-06-22 VITALS — WEIGHT: 139.99 LBS | HEIGHT: 61 IN

## 2022-06-22 DIAGNOSIS — F17.200 NICOTINE DEPENDENCE, UNSPECIFIED, UNCOMPLICATED: ICD-10-CM

## 2022-06-22 DIAGNOSIS — F10.920 ALCOHOL USE, UNSPECIFIED WITH INTOXICATION, UNCOMPLICATED: ICD-10-CM

## 2022-06-22 DIAGNOSIS — R07.89 OTHER CHEST PAIN: ICD-10-CM

## 2022-06-22 LAB
ALBUMIN SERPL ELPH-MCNC: 4 G/DL — SIGNIFICANT CHANGE UP (ref 3.3–5)
ALP SERPL-CCNC: 81 U/L — SIGNIFICANT CHANGE UP (ref 40–120)
ALT FLD-CCNC: 47 U/L — SIGNIFICANT CHANGE UP (ref 12–78)
ANION GAP SERPL CALC-SCNC: 7 MMOL/L — SIGNIFICANT CHANGE UP (ref 5–17)
APPEARANCE UR: CLEAR — SIGNIFICANT CHANGE UP
APTT BLD: 25.7 SEC — LOW (ref 27.5–35.5)
AST SERPL-CCNC: 39 U/L — HIGH (ref 15–37)
BASOPHILS # BLD AUTO: 0.08 K/UL — SIGNIFICANT CHANGE UP (ref 0–0.2)
BASOPHILS NFR BLD AUTO: 0.9 % — SIGNIFICANT CHANGE UP (ref 0–2)
BILIRUB SERPL-MCNC: 0.3 MG/DL — SIGNIFICANT CHANGE UP (ref 0.2–1.2)
BILIRUB UR-MCNC: NEGATIVE — SIGNIFICANT CHANGE UP
BUN SERPL-MCNC: 5 MG/DL — LOW (ref 7–23)
CALCIUM SERPL-MCNC: 9.2 MG/DL — SIGNIFICANT CHANGE UP (ref 8.5–10.1)
CHLORIDE SERPL-SCNC: 105 MMOL/L — SIGNIFICANT CHANGE UP (ref 96–108)
CO2 SERPL-SCNC: 28 MMOL/L — SIGNIFICANT CHANGE UP (ref 22–31)
COLOR SPEC: YELLOW — SIGNIFICANT CHANGE UP
CREAT SERPL-MCNC: 0.59 MG/DL — SIGNIFICANT CHANGE UP (ref 0.5–1.3)
DIFF PNL FLD: ABNORMAL
EGFR: 117 ML/MIN/1.73M2 — SIGNIFICANT CHANGE UP
EOSINOPHIL # BLD AUTO: 0.11 K/UL — SIGNIFICANT CHANGE UP (ref 0–0.5)
EOSINOPHIL NFR BLD AUTO: 1.3 % — SIGNIFICANT CHANGE UP (ref 0–6)
ETHANOL SERPL-MCNC: 258 MG/DL — HIGH (ref 0–10)
FLUAV AG NPH QL: SIGNIFICANT CHANGE UP
FLUBV AG NPH QL: SIGNIFICANT CHANGE UP
GLUCOSE SERPL-MCNC: 99 MG/DL — SIGNIFICANT CHANGE UP (ref 70–99)
GLUCOSE UR QL: NEGATIVE — SIGNIFICANT CHANGE UP
HCT VFR BLD CALC: 36.9 % — SIGNIFICANT CHANGE UP (ref 34.5–45)
HGB BLD-MCNC: 12.4 G/DL — SIGNIFICANT CHANGE UP (ref 11.5–15.5)
IMM GRANULOCYTES NFR BLD AUTO: 0.2 % — SIGNIFICANT CHANGE UP (ref 0–1.5)
INR BLD: 0.96 RATIO — SIGNIFICANT CHANGE UP (ref 0.88–1.16)
KETONES UR-MCNC: NEGATIVE — SIGNIFICANT CHANGE UP
LEUKOCYTE ESTERASE UR-ACNC: NEGATIVE — SIGNIFICANT CHANGE UP
LYMPHOCYTES # BLD AUTO: 2.64 K/UL — SIGNIFICANT CHANGE UP (ref 1–3.3)
LYMPHOCYTES # BLD AUTO: 31.2 % — SIGNIFICANT CHANGE UP (ref 13–44)
MCHC RBC-ENTMCNC: 28.6 PG — SIGNIFICANT CHANGE UP (ref 27–34)
MCHC RBC-ENTMCNC: 33.6 GM/DL — SIGNIFICANT CHANGE UP (ref 32–36)
MCV RBC AUTO: 85 FL — SIGNIFICANT CHANGE UP (ref 80–100)
MONOCYTES # BLD AUTO: 0.39 K/UL — SIGNIFICANT CHANGE UP (ref 0–0.9)
MONOCYTES NFR BLD AUTO: 4.6 % — SIGNIFICANT CHANGE UP (ref 2–14)
NEUTROPHILS # BLD AUTO: 5.22 K/UL — SIGNIFICANT CHANGE UP (ref 1.8–7.4)
NEUTROPHILS NFR BLD AUTO: 61.8 % — SIGNIFICANT CHANGE UP (ref 43–77)
NITRITE UR-MCNC: NEGATIVE — SIGNIFICANT CHANGE UP
PCP SPEC-MCNC: SIGNIFICANT CHANGE UP
PH UR: 7 — SIGNIFICANT CHANGE UP (ref 5–8)
PLATELET # BLD AUTO: 318 K/UL — SIGNIFICANT CHANGE UP (ref 150–400)
POTASSIUM SERPL-MCNC: 3.3 MMOL/L — LOW (ref 3.5–5.3)
POTASSIUM SERPL-SCNC: 3.3 MMOL/L — LOW (ref 3.5–5.3)
PROT SERPL-MCNC: 8 GM/DL — SIGNIFICANT CHANGE UP (ref 6–8.3)
PROT UR-MCNC: NEGATIVE — SIGNIFICANT CHANGE UP
PROTHROM AB SERPL-ACNC: 11.1 SEC — SIGNIFICANT CHANGE UP (ref 10.5–13.4)
RBC # BLD: 4.34 M/UL — SIGNIFICANT CHANGE UP (ref 3.8–5.2)
RBC # FLD: 14.2 % — SIGNIFICANT CHANGE UP (ref 10.3–14.5)
RSV RNA NPH QL NAA+NON-PROBE: SIGNIFICANT CHANGE UP
SARS-COV-2 RNA SPEC QL NAA+PROBE: SIGNIFICANT CHANGE UP
SODIUM SERPL-SCNC: 140 MMOL/L — SIGNIFICANT CHANGE UP (ref 135–145)
SP GR SPEC: 1 — LOW (ref 1.01–1.02)
UROBILINOGEN FLD QL: NEGATIVE — SIGNIFICANT CHANGE UP
WBC # BLD: 8.46 K/UL — SIGNIFICANT CHANGE UP (ref 3.8–10.5)
WBC # FLD AUTO: 8.46 K/UL — SIGNIFICANT CHANGE UP (ref 3.8–10.5)

## 2022-06-22 PROCEDURE — 74177 CT ABD & PELVIS W/CONTRAST: CPT | Mod: 26,MA

## 2022-06-22 PROCEDURE — 99285 EMERGENCY DEPT VISIT HI MDM: CPT

## 2022-06-22 PROCEDURE — 85610 PROTHROMBIN TIME: CPT

## 2022-06-22 PROCEDURE — 74177 CT ABD & PELVIS W/CONTRAST: CPT | Mod: MA

## 2022-06-22 PROCEDURE — 85730 THROMBOPLASTIN TIME PARTIAL: CPT

## 2022-06-22 PROCEDURE — 93005 ELECTROCARDIOGRAM TRACING: CPT

## 2022-06-22 PROCEDURE — 0241U: CPT

## 2022-06-22 PROCEDURE — 80053 COMPREHEN METABOLIC PANEL: CPT

## 2022-06-22 PROCEDURE — 85025 COMPLETE CBC W/AUTO DIFF WBC: CPT

## 2022-06-22 PROCEDURE — 71260 CT THORAX DX C+: CPT | Mod: 26,MA

## 2022-06-22 PROCEDURE — 96374 THER/PROPH/DIAG INJ IV PUSH: CPT | Mod: XU

## 2022-06-22 PROCEDURE — 81001 URINALYSIS AUTO W/SCOPE: CPT

## 2022-06-22 PROCEDURE — 36415 COLL VENOUS BLD VENIPUNCTURE: CPT

## 2022-06-22 PROCEDURE — 71260 CT THORAX DX C+: CPT | Mod: MA

## 2022-06-22 PROCEDURE — 93010 ELECTROCARDIOGRAM REPORT: CPT

## 2022-06-22 PROCEDURE — 99285 EMERGENCY DEPT VISIT HI MDM: CPT | Mod: 25

## 2022-06-22 PROCEDURE — 80307 DRUG TEST PRSMV CHEM ANLYZR: CPT

## 2022-06-22 RX ORDER — KETOROLAC TROMETHAMINE 30 MG/ML
30 SYRINGE (ML) INJECTION ONCE
Refills: 0 | Status: DISCONTINUED | OUTPATIENT
Start: 2022-06-22 | End: 2022-06-22

## 2022-06-22 RX ADMIN — Medication 30 MILLIGRAM(S): at 21:57
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
## 2022-06-22 NOTE — ED PROVIDER NOTE - PATIENT PORTAL LINK FT
You can access the FollowMyHealth Patient Portal offered by [facility] by registering at the following website: [email or web address]. By joining [employer]’s FollowMyHealth portal, you will also be able to view your health information using other applications (apps) compatible with our system.
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
## 2022-06-22 NOTE — ED PROVIDER NOTE - NSFOLLOWUPINSTRUCTIONS_ED_ALL_ED_FT
Take tylenol every four hours for pain . Stop drinking alcohol. Follow up with numbers given by [employer] flyer.
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
## 2022-06-23 VITALS
OXYGEN SATURATION: 98 % | HEART RATE: 89 BPM | TEMPERATURE: 98 F | DIASTOLIC BLOOD PRESSURE: 70 MMHG | SYSTOLIC BLOOD PRESSURE: 164 MMHG | RESPIRATION RATE: 16 BRPM

## 2022-09-13 NOTE — ED ADULT TRIAGE NOTE - WEIGHT IN LBS
Impression: Puckering of macula, left eye
*S/P mac Hole Plan: OCT ordered and stable. Monitor. Pt ed. 147.9

## 2022-12-12 PROBLEM — F41.9 ANXIETY DISORDER, UNSPECIFIED: Chronic | Status: ACTIVE | Noted: 2022-02-06

## 2022-12-27 NOTE — ED PROVIDER NOTE - WET READ LAUNCH FT
Patient w/ Hx of DM, HTN, HLD.  Patient c/o of headache w/ nausea, no vomitting, intermittent since last week, states having high BP and High glucose.  No facial asymmetry, slurred speech, no arm drift, ambulates w/ steady gait.  /76 in ED  FSBG 196. There are no Wet Read(s) to document.

## 2023-06-03 NOTE — ED STATDOCS - DATE/TIME 3
Pt states she will continue to refill at [facility] and requested that the the message from [employer] be disregarded.    Message sent to clinical pool      21-Jun-2022 15:13

## 2023-08-28 ENCOUNTER — EMERGENCY (EMERGENCY)
Facility: HOSPITAL | Age: 43
LOS: 0 days | Discharge: HOME | End: 2023-08-28
Attending: EMERGENCY MEDICINE
Payer: SELF-PAY

## 2023-08-28 VITALS
DIASTOLIC BLOOD PRESSURE: 95 MMHG | TEMPERATURE: 98 F | HEART RATE: 126 BPM | OXYGEN SATURATION: 98 % | HEIGHT: 64 IN | SYSTOLIC BLOOD PRESSURE: 127 MMHG | WEIGHT: 149.91 LBS | RESPIRATION RATE: 18 BRPM

## 2023-08-28 DIAGNOSIS — F41.9 ANXIETY DISORDER, UNSPECIFIED: ICD-10-CM

## 2023-08-28 DIAGNOSIS — F10.120 ALCOHOL ABUSE WITH INTOXICATION, UNCOMPLICATED: ICD-10-CM

## 2023-08-28 PROCEDURE — 99285 EMERGENCY DEPT VISIT HI MDM: CPT

## 2023-08-28 PROCEDURE — 99283 EMERGENCY DEPT VISIT LOW MDM: CPT
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
## 2023-08-28 NOTE — ED ADULT NURSE NOTE - OBJECTIVE STATEMENT
pt presenting for alcohol intox, arrived calm and cooperative. 3 PD members arrived and placed pt in handcuffs. pt needs medical clearance for confinement. Evaluated by MD, pnd further orders

## 2023-08-28 NOTE — ED ADULT TRIAGE NOTE - GLASGOW COMA SCALE: BEST VERBAL RESPONSE, MLM
Isotretinoin Pregnancy And Lactation Text: This medication is Pregnancy Category X and is considered extremely dangerous during pregnancy. It is unknown if it is excreted in breast milk. (V5) oriented
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
## 2023-08-28 NOTE — ED PROVIDER NOTE - PATIENT PORTAL LINK FT
You can access the FollowMyHealth Patient Portal offered by [facility] by registering at the following website: [email or web address]. By joining [employer]’s FollowMyHealth portal, you will also be able to view your health information using other applications (apps) compatible with our system.

## 2023-08-29 PROBLEM — F10.10 ALCOHOL ABUSE, UNCOMPLICATED: Chronic | Status: ACTIVE | Noted: 2022-03-31

## 2023-08-29 PROBLEM — Z69.11: Chronic | Status: ACTIVE | Noted: 2022-06-27

## 2023-08-29 PROBLEM — Z78.9 OTHER SPECIFIED HEALTH STATUS: Chronic | Status: ACTIVE | Noted: 2022-03-30

## 2023-08-29 PROBLEM — F41.9 ANXIETY DISORDER, UNSPECIFIED: Chronic | Status: ACTIVE | Noted: 2022-06-21

## 2024-01-02 ENCOUNTER — EMERGENCY (EMERGENCY)
Facility: HOSPITAL | Age: 44
LOS: 0 days | Discharge: HOME | End: 2024-01-03
Attending: STUDENT IN AN ORGANIZED HEALTH CARE EDUCATION/TRAINING PROGRAM
Payer: SELF-PAY

## 2024-01-02 VITALS
TEMPERATURE: 98 F | HEART RATE: 94 BPM | WEIGHT: 169.98 LBS | SYSTOLIC BLOOD PRESSURE: 114 MMHG | RESPIRATION RATE: 18 BRPM | HEIGHT: 64 IN | OXYGEN SATURATION: 100 % | DIASTOLIC BLOOD PRESSURE: 76 MMHG

## 2024-01-02 DIAGNOSIS — R41.82 ALTERED MENTAL STATUS, UNSPECIFIED: ICD-10-CM

## 2024-01-02 DIAGNOSIS — F41.9 ANXIETY DISORDER, UNSPECIFIED: ICD-10-CM

## 2024-01-02 DIAGNOSIS — F10.229 ALCOHOL DEPENDENCE WITH INTOXICATION, UNSPECIFIED: ICD-10-CM

## 2024-01-02 PROCEDURE — 99284 EMERGENCY DEPT VISIT MOD MDM: CPT

## 2024-01-02 PROCEDURE — 99285 EMERGENCY DEPT VISIT HI MDM: CPT

## 2024-01-02 RX ORDER — FOLIC ACID 0.8 MG
1 TABLET ORAL ONCE
Refills: 0 | Status: COMPLETED | OUTPATIENT
Start: 2024-01-02 | End: 2024-01-02

## 2024-01-02 RX ORDER — THIAMINE MONONITRATE (VIT B1) 100 MG
100 TABLET ORAL ONCE
Refills: 0 | Status: COMPLETED | OUTPATIENT
Start: 2024-01-02 | End: 2024-01-02

## 2024-01-02 RX ADMIN — Medication 100 MILLIGRAM(S): at 23:13

## 2024-01-02 RX ADMIN — Medication 1 MILLIGRAM(S): at 23:13

## 2024-01-02 NOTE — ED ADULT NURSE NOTE - CHIEF COMPLAINT QUOTE
166.4 patient brought in by EMS for alcohol intoxication.  patient states she drank today because she hates her life. patient denies SI/HI
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
## 2024-01-02 NOTE — ED ADULT NURSE NOTE - NSFALLRISKINTERV_ED_ALL_ED
Assistance OOB with selected safe patient handling equipment if applicable/Assistance with ambulation/Communicate fall risk and risk factors to all staff, patient, and family/Monitor gait and stability/Monitor for mental status changes and reorient to person, place, and time, as needed/Provide patient with walking aids/Provide visual cue: yellow wristband, yellow gown, etc/Reinforce activity limits and safety measures with patient and family/Toileting schedule using arm’s reach rule for commode and bathroom/Use of alarms - bed, stretcher, chair and/or video monitoring/Call bell, personal items and telephone in reach/Instruct patient to call for assistance before getting out of bed/chair/stretcher/Non-slip footwear applied when patient is off stretcher/[place] to call system/Physically safe environment - no spills, clutter or unnecessary equipment/Purposeful Proactive Rounding/Room/bathroom lighting operational, light cord in reach Assistance OOB with selected safe patient handling equipment if applicable/Assistance with ambulation/Communicate fall risk and risk factors to all staff, patient, and family/Monitor gait and stability/Monitor for mental status changes and reorient to person, place, and time, as needed/Provide patient with walking aids/Provide visual cue: yellow wristband, yellow gown, etc/Reinforce activity limits and safety measures with patient and family/Toileting schedule using arm’s reach rule for commode and bathroom/Use of alarms - bed, stretcher, chair and/or video monitoring/Call bell, personal items and telephone in reach/Instruct patient to call for assistance before getting out of bed/chair/stretcher/Non-slip footwear applied when patient is off stretcher/[place] to call system/Physically safe environment - no spills, clutter or unnecessary equipment/Purposeful Proactive Rounding/Room/bathroom lighting operational, light cord in reach

## 2024-01-02 NOTE — ED PROVIDER NOTE - OBJECTIVE STATEMENT
44 y/o female with PMHx of anxiety, EtOH abuse presents to ED intoxicated. Pt states she drank 2 bottles of alcohol prior to arrival. Has hx of EtOH abuse. Denies any falls/trauma. Currently not in any pain. Denies other drug use. 42 y/o female with PMHx of anxiety, EtOH abuse presents to ED intoxicated. Pt states she drank 2 bottles of alcohol prior to arrival. Has hx of EtOH abuse. Denies any falls/trauma. Currently not in any pain. Denies other drug use.

## 2024-01-02 NOTE — ED PROVIDER NOTE - PHYSICAL EXAMINATION
Vital signs reviewed  GENERAL: +intoxicated  HEAD: NCAT  EYES: Anicteric  ENT: MMM  NECK: Supple, non tender  RESPIRATORY: Normal respiratory effort. CTA B/L. No wheezing, rales, rhonchi  CARDIOVASCULAR: Regular rate and rhythm  ABDOMEN: Soft. Nondistended. Nontender. No guarding or rebound. No CVA tenderness.  MUSCULOSKELETAL/EXTREMITIES: Brisk cap refill. 2+ radial pulses. No leg edema.  SKIN:  Warm and dry  NEURO: +intoxicated, No gross FND.  PSYCHIATRIC: Cooperative. Affect appropriate.

## 2024-01-02 NOTE — ED ADULT TRIAGE NOTE - NS ED NURSE AMBULANCES
[facility]

## 2024-01-02 NOTE — ED PROVIDER NOTE - NSFOLLOWUPINSTRUCTIONS_ED_ALL_ED_FT
Alcohol Intoxication  Alcohol intoxication occurs when a person no longer thinks clearly or functions well after drinking alcohol. This is also referred to as becoming impaired. Intoxication can occur with just one drink. The legal definition of alcohol intoxication depends on the amount of alcohol in the blood (blood alcohol concentration, GLENNA). GLENNA of 80–100 mg/dL or higher is commonly considered legally intoxicated. The level of impairment depends on:  The amount of alcohol the person had.  The person's age, gender, and weight.  How often the person drinks.  Whether the person has other medical conditions, such as diabetes, seizures, or a heart condition.  Alcohol intoxication can range from mild to severe. The condition can be dangerous, especially if the person:  Also took certain drugs or prescription medicines.  Drinks a large amount of alcohol in a short period of time (binge drinks).  For women, binge drinking is having four or more drinks at one time.  For men, binge drinking is having five or more drinks at one time.  If you or anyone around you appears intoxicated, speak up and act.    What are the causes?  This condition is caused by drinking alcohol.    What increases the risk?  The following factors may make you more likely to develop this condition:  Peer pressure in young adults.  Difficulty managing stress.  History of drug or alcohol abuse.  Family history of drug or alcohol abuse.  Combining alcohol with drugs.  Low body weight.  Binge drinking.  What are the signs or symptoms?  Symptoms of alcohol intoxication can vary from person to person. Symptoms can be mild, moderate, or severe.    Symptoms of mild alcohol intoxication may include:  Feeling relaxed or sleepy.  Having mild difficulty with coordination, speech, memory, or attention.  Symptoms of moderate alcohol intoxication may include:  Strong anger or extreme sadness.  Moderate difficulty with coordination, speech, memory, or attention.  Symptoms of severe alcohol intoxication may include:  Severe difficulty with coordination, speech, memory, or attention.  Passing out.  Vomiting.  Confusion.  Slow breathing.  Coma.  Intoxication can change quickly from mild to severe. It can cause coma or death, especially in people who do not drink alcohol often.    How is this diagnosed?  Your health care provider will ask you how much alcohol you drank and what kind you had. Intoxication may also be diagnosed based on:  Your symptoms and medical history.  A physical exam.  A blood test that measures GLENNA.  A smell of alcohol on your breath.  How is this treated?  Treatment for alcohol intoxication may include:  Being monitored in an emergency department, hospital, or treatment center until your GLENNA comes down and it is safe for you to go home.  IV fluids to prevent or treat loss of fluid in the body (dehydration).  Medicine to treat nausea or vomiting or to get rid of alcohol in the body.  Counseling about the dangers of using alcohol.  Treatment for substance use disorder.  Oxygen therapy or a breathing machine (ventilator).  Drinking alcohol for a long time can have long-term effects on your brain, heart, and digestive system. These effects can be serious and may also require treatment.    Follow these instructions at home:  A sign showing that a person should not drive.  Eating and drinking    A 12-ounce bottle of beer, a 5-ounce glass of wine, and a 1.5-ounce shot of hard liquor.  Do not drink alcohol if:  Your health care provider tells you not to drink.  You are pregnant, may be pregnant, or are planning to become pregnant.  You are under the legal drinking age, or under 21 years old in the [place]  You are taking medicines that should not be taken with alcohol.  You have a medical condition, and alcohol makes it worse.  You need to drive or perform activities that require you to be alert.  You have substance use disorder.  Ask your health care provider if alcohol is safe for you. If your health care provider allows you to drink alcohol, limit how much you have to:  0–1 drink a day for women who are not pregnant.  0–2 drinks a day for men.  Know how much alcohol is in your drink. In the [place] one drink equals one 12 oz bottle of beer (355 mL), one 5 oz glass of wine (148 mL), or one 1½ oz glass of hard liquor (44 mL).  Avoid drinking alcohol on an empty stomach.  Alcohol increases urination. It is important to stay hydrated and avoid caffeine.  Avoid drinking more than one drink per hour.  When having multiple drinks, drink water or a non-alcoholic beverage between alcoholic drinks.  General instructions    Take over-the-counter and prescription medicines only as told by your health care provider.  Do not drive after drinking any amount of alcohol. Plan for a designated  or another way to go home.  Have someone responsible stay with you while you are intoxicated. You should notbe left alone.  Contact a health care provider if:  You do not feel better after a few days.  You have problems at work, at school, or at home due to drinking.  Get help right away if:  You have any of the following:  Trouble staying awake.  Moderate to severe trouble with coordination, speech, memory, or attention.  You are told you may have had a seizure.  Vomiting bright red blood or material that looks like coffee grounds.  Bloody stool (feces). The blood may make your stool bright red, black, or tarry.  These symptoms may be an emergency. Get help right away. Call 911.  Do not wait to see if the symptoms will go away.  Do not drive yourself to the hospital.  Also, get help right away if:  You have thoughts about hurting yourself or others.  Take one of these steps if you feel like you may hurt yourself or others, or have thoughts about taking your own life:  Call 911.  Call the National Suicide Prevention Lifeline at 1-126.706.8113 or 107. This is open 24 hours a day.  Text the Crisis Text Line at 885276.  Summary  Alcohol intoxication occurs when a person no longer thinks clearly or functions well after drinking alcohol.  Ask your health care provider if alcohol is safe for you. If your health care provider allows you to drink alcohol, limit how much you have.  Contact your health care provider if drinking has caused you problems at work, school, or home.  Get help right away if you have thoughts about hurting yourself or others.  This information is not intended to replace advice given to you by your health care provider. Make sure you discuss any questions you have with your health care provider. Alcohol Intoxication  Alcohol intoxication occurs when a person no longer thinks clearly or functions well after drinking alcohol. This is also referred to as becoming impaired. Intoxication can occur with just one drink. The legal definition of alcohol intoxication depends on the amount of alcohol in the blood (blood alcohol concentration, GLENNA). GELNNA of 80–100 mg/dL or higher is commonly considered legally intoxicated. The level of impairment depends on:  The amount of alcohol the person had.  The person's age, gender, and weight.  How often the person drinks.  Whether the person has other medical conditions, such as diabetes, seizures, or a heart condition.  Alcohol intoxication can range from mild to severe. The condition can be dangerous, especially if the person:  Also took certain drugs or prescription medicines.  Drinks a large amount of alcohol in a short period of time (binge drinks).  For women, binge drinking is having four or more drinks at one time.  For men, binge drinking is having five or more drinks at one time.  If you or anyone around you appears intoxicated, speak up and act.    What are the causes?  This condition is caused by drinking alcohol.    What increases the risk?  The following factors may make you more likely to develop this condition:  Peer pressure in young adults.  Difficulty managing stress.  History of drug or alcohol abuse.  Family history of drug or alcohol abuse.  Combining alcohol with drugs.  Low body weight.  Binge drinking.  What are the signs or symptoms?  Symptoms of alcohol intoxication can vary from person to person. Symptoms can be mild, moderate, or severe.    Symptoms of mild alcohol intoxication may include:  Feeling relaxed or sleepy.  Having mild difficulty with coordination, speech, memory, or attention.  Symptoms of moderate alcohol intoxication may include:  Strong anger or extreme sadness.  Moderate difficulty with coordination, speech, memory, or attention.  Symptoms of severe alcohol intoxication may include:  Severe difficulty with coordination, speech, memory, or attention.  Passing out.  Vomiting.  Confusion.  Slow breathing.  Coma.  Intoxication can change quickly from mild to severe. It can cause coma or death, especially in people who do not drink alcohol often.    How is this diagnosed?  Your health care provider will ask you how much alcohol you drank and what kind you had. Intoxication may also be diagnosed based on:  Your symptoms and medical history.  A physical exam.  A blood test that measures GLENNA.  A smell of alcohol on your breath.  How is this treated?  Treatment for alcohol intoxication may include:  Being monitored in an emergency department, hospital, or treatment center until your GLENNA comes down and it is safe for you to go home.  IV fluids to prevent or treat loss of fluid in the body (dehydration).  Medicine to treat nausea or vomiting or to get rid of alcohol in the body.  Counseling about the dangers of using alcohol.  Treatment for substance use disorder.  Oxygen therapy or a breathing machine (ventilator).  Drinking alcohol for a long time can have long-term effects on your brain, heart, and digestive system. These effects can be serious and may also require treatment.    Follow these instructions at home:  A sign showing that a person should not drive.  Eating and drinking    A 12-ounce bottle of beer, a 5-ounce glass of wine, and a 1.5-ounce shot of hard liquor.  Do not drink alcohol if:  Your health care provider tells you not to drink.  You are pregnant, may be pregnant, or are planning to become pregnant.  You are under the legal drinking age, or under 21 years old in the [place]  You are taking medicines that should not be taken with alcohol.  You have a medical condition, and alcohol makes it worse.  You need to drive or perform activities that require you to be alert.  You have substance use disorder.  Ask your health care provider if alcohol is safe for you. If your health care provider allows you to drink alcohol, limit how much you have to:  0–1 drink a day for women who are not pregnant.  0–2 drinks a day for men.  Know how much alcohol is in your drink. In the [place] one drink equals one 12 oz bottle of beer (355 mL), one 5 oz glass of wine (148 mL), or one 1½ oz glass of hard liquor (44 mL).  Avoid drinking alcohol on an empty stomach.  Alcohol increases urination. It is important to stay hydrated and avoid caffeine.  Avoid drinking more than one drink per hour.  When having multiple drinks, drink water or a non-alcoholic beverage between alcoholic drinks.  General instructions    Take over-the-counter and prescription medicines only as told by your health care provider.  Do not drive after drinking any amount of alcohol. Plan for a designated  or another way to go home.  Have someone responsible stay with you while you are intoxicated. You should notbe left alone.  Contact a health care provider if:  You do not feel better after a few days.  You have problems at work, at school, or at home due to drinking.  Get help right away if:  You have any of the following:  Trouble staying awake.  Moderate to severe trouble with coordination, speech, memory, or attention.  You are told you may have had a seizure.  Vomiting bright red blood or material that looks like coffee grounds.  Bloody stool (feces). The blood may make your stool bright red, black, or tarry.  These symptoms may be an emergency. Get help right away. Call 911.  Do not wait to see if the symptoms will go away.  Do not drive yourself to the hospital.  Also, get help right away if:  You have thoughts about hurting yourself or others.  Take one of these steps if you feel like you may hurt yourself or others, or have thoughts about taking your own life:  Call 911.  Call the National Suicide Prevention Lifeline at 1-255.967.4294 or 346. This is open 24 hours a day.  Text the Crisis Text Line at 115112.  Summary  Alcohol intoxication occurs when a person no longer thinks clearly or functions well after drinking alcohol.  Ask your health care provider if alcohol is safe for you. If your health care provider allows you to drink alcohol, limit how much you have.  Contact your health care provider if drinking has caused you problems at work, school, or home.  Get help right away if you have thoughts about hurting yourself or others.  This information is not intended to replace advice given to you by your health care provider. Make sure you discuss any questions you have with your health care provider.

## 2024-01-02 NOTE — ED ADULT TRIAGE NOTE - CHIEF COMPLAINT QUOTE
patient brought in by EMS for alcohol intoxication.  patient states she drank today because she hates her life. patient denies SI/HI

## 2024-01-02 NOTE — ED PROVIDER NOTE - CLINICAL SUMMARY MEDICAL DECISION MAKING FREE TEXT BOX
Patient presenting with history of alcohol dependence, sluggish behabvior, altered mental status, and smelling of alcohol most consistent with ethanol intoxication. They are hemodynamically stable and afebrile. Bedside glucose check without evidence of severe hypoglycemia. No signs of significant trauma or other etiology of altered mental status on initial physical exam. Airway is maintained. There is no respiratory depression. Considered DDX but unlikely given history and exam: intracranial bleed, opioid vs benzodiazepine/GHB intoxication, other substance use, sepsis, hypothyroidism, hypothermia.    PLAN:  - Observe until clinically sober, with re-evaluation demonstrating ability to ambulate safely, tolerate oral intake, articulate a safe discharge plan.   - IM lorazepam or midazolam PRN agitation.  - Thiamine Folic acid   no trauma on exam, no falls. will defer imaging for now

## 2024-01-02 NOTE — ED PROVIDER NOTE - PATIENT PORTAL LINK FT
You can access the FollowMyHealth Patient Portal offered by [facility] by registering at the following website: [email or web address]. By joining [employer]’s FollowMyHealth portal, you will also be able to view your health information using other applications (apps) compatible with our system. You can access the FollowMyHealth Patient Portal offered by [facility] by registering at the following website: [email or web address]. By joining [employer]’s FollowMyHealth portal, you will also be able to view your health information using other applications (apps) compatible with our system.

## 2024-01-03 VITALS
SYSTOLIC BLOOD PRESSURE: 121 MMHG | OXYGEN SATURATION: 99 % | RESPIRATION RATE: 17 BRPM | HEART RATE: 98 BPM | DIASTOLIC BLOOD PRESSURE: 74 MMHG | TEMPERATURE: 98 F

## 2024-08-28 ENCOUNTER — APPOINTMENT (OUTPATIENT)
Dept: MAMMOGRAPHY | Facility: CLINIC | Age: 44
End: 2024-08-28

## 2024-09-04 ENCOUNTER — OUTPATIENT (OUTPATIENT)
Dept: OUTPATIENT SERVICES | Facility: HOSPITAL | Age: 44
LOS: 1 days | End: 2024-09-04
Payer: COMMERCIAL

## 2024-09-04 ENCOUNTER — RESULT REVIEW (OUTPATIENT)
Age: 44
End: 2024-09-04

## 2024-09-04 ENCOUNTER — APPOINTMENT (OUTPATIENT)
Dept: MAMMOGRAPHY | Facility: CLINIC | Age: 44
End: 2024-09-04
Payer: COMMERCIAL

## 2024-09-04 DIAGNOSIS — Z00.8 ENCOUNTER FOR OTHER GENERAL EXAMINATION: ICD-10-CM

## 2024-09-04 PROCEDURE — 77063 BREAST TOMOSYNTHESIS BI: CPT | Mod: 26

## 2024-09-04 PROCEDURE — 77067 SCR MAMMO BI INCL CAD: CPT | Mod: 26

## 2024-09-04 PROCEDURE — 77067 SCR MAMMO BI INCL CAD: CPT

## 2024-09-04 PROCEDURE — 77063 BREAST TOMOSYNTHESIS BI: CPT

## 2025-03-17 NOTE — ED ADULT NURSE NOTE - CAS DISCH TRANSFER METHOD
Bed: TL 01  Expected date:   Expected time:   Means of arrival: Personal Transportation  Comments:     Oskar Mckay, NP  03/17/25 0317    
Private car
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
